# Patient Record
Sex: MALE | Race: WHITE | NOT HISPANIC OR LATINO | Employment: FULL TIME | URBAN - METROPOLITAN AREA
[De-identification: names, ages, dates, MRNs, and addresses within clinical notes are randomized per-mention and may not be internally consistent; named-entity substitution may affect disease eponyms.]

---

## 2019-02-12 NOTE — PROGRESS NOTES
"  SUBJECTIVE:   CC: Brian Darling is an 38 year old male who presents for preventive health visit.     Healthy Habits:    Do you get at least three servings of calcium containing foods daily (dairy, green leafy vegetables, etc.)? { :694978::\"yes\"}    Amount of exercise or daily activities, outside of work: { :764373}    Problems taking medications regularly { :794429::\"No\"}    Medication side effects: { :777557::\"No\"}    Have you had an eye exam in the past two years? { :653700}    Do you see a dentist twice per year? { :755480}    Do you have sleep apnea, excessive snoring or daytime drowsiness?{ :188692}  {Outside tests to abstract? :176677}    {additional problems to add (Optional):525637}    Today's PHQ-2 Score:   PHQ-2 ( 1999 Pfizer) 11/10/2015   Q1: Little interest or pleasure in doing things 0   Q2: Feeling down, depressed or hopeless 0   PHQ-2 Score 0     {PHQ-2 LOOK IN ASSESSMENTS (Optional) :632104}  Abuse: Current or Past(Physical, Sexual or Emotional)- {YES/NO/NA:581722}  Do you feel safe in your environment? {YES/NO/NA:593989}    Social History     Tobacco Use     Smoking status: Never Smoker   Substance Use Topics     Alcohol use: Yes     Comment: rarely     If you drink alcohol do you typically have >3 drinks per day or >7 drinks per week? {ETOH :296780}                      Last PSA: No results found for: PSA    Reviewed orders with patient. Reviewed health maintenance and updated orders accordingly - {Yes/No:697692::\"Yes\"}  {Chronicprobdata (Optional):149798}    Reviewed and updated as needed this visit by clinical staff         Reviewed and updated as needed this visit by Provider        {HISTORY OPTIONS (Optional):652728}    ROS:  { :048547::\"CONSTITUTIONAL: NEGATIVE for fever, chills, change in weight\",\"INTEGUMENTARY/SKIN: NEGATIVE for worrisome rashes, moles or lesions\",\"EYES: NEGATIVE for vision changes or irritation\",\"ENT: NEGATIVE for ear, mouth and throat problems\",\"RESP: NEGATIVE for " "significant cough or SOB\",\"CV: NEGATIVE for chest pain, palpitations or peripheral edema\",\"GI: NEGATIVE for nausea, abdominal pain, heartburn, or change in bowel habits\",\" male: negative for dysuria, hematuria, decreased urinary stream, erectile dysfunction, urethral discharge\",\"MUSCULOSKELETAL: NEGATIVE for significant arthralgias or myalgia\",\"NEURO: NEGATIVE for weakness, dizziness or paresthesias\",\"PSYCHIATRIC: NEGATIVE for changes in mood or affect\"}    OBJECTIVE:   There were no vitals taken for this visit.  EXAM:  {Exam Choices:046061}    {Diagnostic Test Results (Optional):169785::\"Diagnostic Test Results:\",\"none \"}    ASSESSMENT/PLAN:   {Diag Picklist:516674}    COUNSELING:  {MALE COUNSELING MESSAGES:301899::\"Reviewed preventive health counseling, as reflected in patient instructions\"}    BP Readings from Last 1 Encounters:   11/10/15 124/78     Estimated body mass index is 25.6 kg/m  as calculated from the following:    Height as of 11/10/15: 1.791 m (5' 10.5\").    Weight as of 11/10/15: 82.1 kg (181 lb).    {BP Counseling- Complete if BP >= 120/80  (Optional):901999}  {Weight Management Plan (ACO) Complete if BMI is abnormal-  Ages 18-64  BMI >24.9.  Age 65+ with BMI <23 or >30 (Optional):785898}     reports that  has never smoked. He does not have any smokeless tobacco history on file.  {Tobacco Cessation -- Complete if patient is a smoker (Optional):246226}    Counseling Resources:  ATP IV Guidelines  Pooled Cohorts Equation Calculator  FRAX Risk Assessment  ICSI Preventive Guidelines  Dietary Guidelines for Americans, 2010  USDA's MyPlate  ASA Prophylaxis  Lung CA Screening    Juanita May MD  [facility]  "

## 2019-02-19 NOTE — PROGRESS NOTES
"  Family history of prostate cancer: No  Last PSA: NA  Doing self testicular exam: NO     Family history of colon cancer:No  Last colonscopy: NA     Family history of CAD:No  Last Cholesterol:   Lab Results   Component Value Date    CHOL 179 11/03/2015     Lab Results   Component Value Date    HDL 44 11/03/2015     Lab Results   Component Value Date    LDL 96 11/03/2015     Lab Results   Component Value Date    TRIG 195 11/03/2015     Lab Results   Component Value Date    CHOLHDLRATIO 4.1 11/03/2015          Immunizations:    Date last DT Tdap 2006, due  Date last Pneumovax NA,   Date last Flu 2015      Seat Belt:{YES/NO (DEFAULT IS YES):204276::\"YES\"}   Sunscreen use: {YES/NO (DEFAULT IS YES):061863::\"YES\"}  Calcium Intake: ***   Health Care Directive:{YES/NO (DEFAULT IS YES):579563::\"YES\"}   Sexually Active:YES      Current contraception: oral contraceptives     Current Concerns: ***          Patient Ed:  Reviewed health maintenance including diet, regular exercise, and periodic exams.     The risks, benefits, and treatment options of prescribed medications or other treatments have been discussed with the patient.  The patient should call or schedule a follow up appt if no improvement or other problems.   "

## 2019-02-20 ENCOUNTER — OFFICE VISIT (OUTPATIENT)
Dept: FAMILY MEDICINE | Facility: CLINIC | Age: 39
End: 2019-02-20
Payer: COMMERCIAL

## 2019-02-20 VITALS
SYSTOLIC BLOOD PRESSURE: 121 MMHG | BODY MASS INDEX: 28.58 KG/M2 | HEIGHT: 70 IN | DIASTOLIC BLOOD PRESSURE: 77 MMHG | WEIGHT: 199.6 LBS | HEART RATE: 65 BPM | TEMPERATURE: 97.9 F | RESPIRATION RATE: 16 BRPM

## 2019-02-20 DIAGNOSIS — Z00.00 ROUTINE GENERAL MEDICAL EXAMINATION AT A HEALTH CARE FACILITY: Primary | ICD-10-CM

## 2019-02-20 DIAGNOSIS — Z23 NEED FOR TDAP VACCINATION: ICD-10-CM

## 2019-02-20 DIAGNOSIS — M79.644 THUMB PAIN, RIGHT: ICD-10-CM

## 2019-02-20 DIAGNOSIS — L30.8 OTHER ECZEMA: ICD-10-CM

## 2019-02-20 PROCEDURE — 90471 IMMUNIZATION ADMIN: CPT | Performed by: FAMILY MEDICINE

## 2019-02-20 PROCEDURE — 90715 TDAP VACCINE 7 YRS/> IM: CPT | Performed by: FAMILY MEDICINE

## 2019-02-20 PROCEDURE — 99385 PREV VISIT NEW AGE 18-39: CPT | Mod: 25 | Performed by: FAMILY MEDICINE

## 2019-02-20 PROCEDURE — 99213 OFFICE O/P EST LOW 20 MIN: CPT | Mod: 25 | Performed by: FAMILY MEDICINE

## 2019-02-20 RX ORDER — TRIAMCINOLONE ACETONIDE 1 MG/G
CREAM TOPICAL
Qty: 80 G | Refills: 1 | Status: SHIPPED | OUTPATIENT
Start: 2019-02-20

## 2019-02-20 ASSESSMENT — ENCOUNTER SYMPTOMS
NAUSEA: 0
DIZZINESS: 0
PALPITATIONS: 0
NERVOUS/ANXIOUS: 0
EYE PAIN: 0
HEADACHES: 1
SHORTNESS OF BREATH: 0
HEARTBURN: 1
COUGH: 0
MYALGIAS: 0
HEMATURIA: 0
WEAKNESS: 0
ABDOMINAL PAIN: 0
PARESTHESIAS: 0
FEVER: 0
DYSURIA: 0
SORE THROAT: 0
CHILLS: 0
FREQUENCY: 0
CONSTIPATION: 0
HEMATOCHEZIA: 0
DIARRHEA: 0
ARTHRALGIAS: 1

## 2019-02-20 ASSESSMENT — MIFFLIN-ST. JEOR: SCORE: 1831.63

## 2019-02-20 NOTE — NURSING NOTE
Screening Questionnaire for Adult Immunization    Are you sick today?   No   Do you have allergies to medications, food, a vaccine component or latex?   No   Have you ever had a serious reaction after receiving a vaccination?   No   Do you have a long-term health problem with heart disease, lung disease, asthma, kidney disease, metabolic disease (e.g. diabetes), anemia, or other blood disorder?   No   Do you have cancer, leukemia, HIV/AIDS, or any other immune system problem?   No   In the past 3 months, have you taken medications that affect  your immune system, such as prednisone, other steroids, or anticancer drugs; drugs for the treatment of rheumatoid arthritis, Crohn s disease, or psoriasis; or have you had radiation treatments?   No   Have you had a seizure, or a brain or other nervous system problem?   No   During the past year, have you received a transfusion of blood or blood     products, or been given immune (gamma) globulin or antiviral drug?   No   For women: Are you pregnant or is there a chance you could become        pregnant during the next month?   No   Have you received any vaccinations in the past 4 weeks?   No     Immunization questionnaire answers were all negative.        Per orders of Dr. May, injection of tdap given by Joyce Willis. Patient instructed to remain in clinic for 15 minutes afterwards, and to report any adverse reaction to me immediately.       Screening performed by Joyce Willis on 2/20/2019 at 2:45 PM.

## 2019-02-20 NOTE — PROGRESS NOTES
SUBJECTIVE:   CC: Brian Darling is an 38 year old male who presents for preventative health visit.     Physical   Annual:     Getting at least 3 servings of Calcium per day:  Yes    Bi-annual eye exam:  NO    Dental care twice a year:  Yes    Sleep apnea or symptoms of sleep apnea:  None    Diet:  Regular (no restrictions)    Frequency of exercise:  1 day/week    Duration of exercise:  Less than 15 minutes    Taking medications regularly:  Not Applicable    Additional concerns today:  No    PHQ-2 Total Score: 0              Today's PHQ-2 Score:   PHQ-2 ( 1999 Pfizer) 2/20/2019   Q1: Little interest or pleasure in doing things 0   Q2: Feeling down, depressed or hopeless 0   PHQ-2 Score 0   Q1: Little interest or pleasure in doing things Not at all   Q2: Feeling down, depressed or hopeless Not at all   PHQ-2 Score 0       Abuse: Current or Past(Physical, Sexual or Emotional)- No  Do you feel safe in your environment? Yes    Social History     Tobacco Use     Smoking status: Never Smoker     Smokeless tobacco: Never Used   Substance Use Topics     Alcohol use: Yes     Comment: rarely     Alcohol Use 2/20/2019   If you drink alcohol do you typically have greater than 3 drinks per day OR greater than 7 drinks per week? No   No flowsheet data found.      Reviewed orders with patient. Reviewed health maintenance and updated orders accordingly - Yes      Family history of prostate cancer: No  Last PSA: NA  Doing self testicular exam: NO     Family history of colon cancer:No  Last colonscopy: NA     Family history of CAD:No  Last Cholesterol:   Lab Results   Component Value Date    CHOL 179 11/03/2015     Lab Results   Component Value Date    HDL 44 11/03/2015     Lab Results   Component Value Date    LDL 96 11/03/2015     Lab Results   Component Value Date    TRIG 195 11/03/2015     Lab Results   Component Value Date    CHOLHDLRATIO 4.1 11/03/2015          Immunizations:    Date last DT Tdap 2006, due  Date last  Pneumovax NA,   Date last Flu 2015      Seat Belt:YES   Sunscreen use: YES  Calcium Intake: adq  Health Care Directive:NO   Sexually Active:YES      Current contraception: oral contraceptives     Current Concerns: right hand dominant, c/o right thumb pain with loaded extension and  to open something.  Had been about 6 months,  No triggering event, no previous injury, not present daily.            Patient Ed:  Reviewed health maintenance including diet, regular exercise, and periodic exams.     The risks, benefits, and treatment options of prescribed medications or other treatments have been discussed with the patient.  The patient should call or schedule a follow up appt if no improvement or other problems.     Labs reviewed in EPIC  BP Readings from Last 3 Encounters:   02/20/19 121/77   11/10/15 124/78    Wt Readings from Last 3 Encounters:   02/20/19 90.5 kg (199 lb 9.6 oz)   11/10/15 82.1 kg (181 lb)                  Patient Active Problem List   Diagnosis     CARDIOVASCULAR SCREENING; LDL GOAL LESS THAN 160     Migraine without aura and without status migrainosus, not intractable     Past Surgical History:   Procedure Laterality Date     ENT SURGERY      tonsils       Social History     Tobacco Use     Smoking status: Never Smoker     Smokeless tobacco: Never Used   Substance Use Topics     Alcohol use: Yes     Comment: rarely     Family History   Problem Relation Age of Onset     Ulcerative Colitis Mother      Diabetes Father 50     Coronary Artery Disease Maternal Grandmother      Cerebrovascular Disease Maternal Grandmother      Breast Cancer Paternal Grandmother      Diabetes Paternal Grandfather      Depression Sister      Anxiety Disorder Sister          Current Outpatient Medications   Medication Sig Dispense Refill     triamcinolone (KENALOG) 0.1 % external cream Apply sparingly to affected area three times daily as needed 80 g 1       Reviewed and updated as needed this visit by clinical  "staff  Tobacco  Allergies  Meds  Problems  Med Hx  Surg Hx  Fam Hx  Soc Hx          Reviewed and updated as needed this visit by Provider  Tobacco  Allergies  Meds  Problems  Med Hx  Surg Hx  Fam Hx            Review of Systems   Constitutional: Negative for chills and fever.   HENT: Negative for congestion, ear pain, hearing loss and sore throat.    Eyes: Negative for pain and visual disturbance.   Respiratory: Negative for cough and shortness of breath.    Cardiovascular: Negative for chest pain, palpitations and peripheral edema.   Gastrointestinal: Positive for heartburn (episodic, uses zantac when needed). Negative for abdominal pain, constipation, diarrhea, hematochezia and nausea.   Genitourinary: Negative for discharge, dysuria, frequency, genital sores, hematuria, impotence and urgency.   Musculoskeletal: Positive for arthralgias. Negative for myalgias.   Skin: Negative for rash.   Neurological: Positive for headaches (1-2 per month, resolve with excedrin, no change in pattern or frequency has adverse rxn to imitrex has not tried any other triptan). Negative for dizziness, weakness and paresthesias.   Psychiatric/Behavioral: Negative for mood changes. The patient is not nervous/anxious.          OBJECTIVE:   /77   Pulse 65   Temp 97.9  F (36.6  C) (Oral)   Resp 16   Ht 1.778 m (5' 10\")   Wt 90.5 kg (199 lb 9.6 oz)   BMI 28.64 kg/m      Physical Exam  GENERAL: healthy, alert and no distress  EYES: Eyes grossly normal to inspection, PERRL and conjunctivae and sclerae normal  HENT: ear canals and TM's normal, nose and mouth without ulcers or lesions  NECK: no adenopathy, no asymmetry, masses, or scars and thyroid normal to palpation  RESP: lungs clear to auscultation - no rales, rhonchi or wheezes  CV: regular rate and rhythm, normal S1 S2, no S3 or S4, no murmur, click or rub, no peripheral edema and peripheral pulses strong  ABDOMEN: soft, nontender, no hepatosplenomegaly, no masses " "and bowel sounds normal   (male): normal male genitalia without lesions or urethral discharge, no hernia  MS: no gross musculoskeletal defects noted, no edema, right thumb with no ttp over DIP, able to provoke symptoms  SKIN: no suspicious lesions or rashes  NEURO: Normal strength and tone, mentation intact and speech normal  PSYCH: mentation appears normal, affect normal/brightDiagnostic Test Results:  none         ASSESSMENT/PLAN:   (Z00.00) Routine general medical examination at a health care facility  (primary encounter diagnosis)  Comment: preventive needs reviewed  Plan: see orders in Epic. F/u 2 yrs for fasting labs/HIV and exam    (L30.8) Other eczema  Comment: stable  Plan: triamcinolone (KENALOG) 0.1 % external cream,         OFFICE/OUTPT VISIT,EST,LEVL II        Refill x 1 yr     (M79.644) Thumb pain, right  Comment: no injury  Plan: [facility] HAND, AND CHIROPRACTIC REFERRAL,         OFFICE/OUTPT VISIT,EST,LEVL II        Refer to hand therapy    (Z23) Need for Tdap vaccination  Comment: due  Plan: TDAP, IM (10 - 64 YRS) - [employer]acel              COUNSELING:   Reviewed preventive health counseling, as reflected in patient instructions  Special attention given to:        Regular exercise       Healthy diet/nutrition    BP Readings from Last 1 Encounters:   02/20/19 121/77     Estimated body mass index is 28.64 kg/m  as calculated from the following:    Height as of this encounter: 1.778 m (5' 10\").    Weight as of this encounter: 90.5 kg (199 lb 9.6 oz).      Weight management plan: Discussed healthy diet and exercise guidelines     reports that  has never smoked. he has never used smokeless tobacco.      Counseling Resources:  ATP IV Guidelines  Pooled Cohorts Equation Calculator  FRAX Risk Assessment  ICSI Preventive Guidelines  Dietary Guidelines for Americans, 2010  USDA's MyPlate  ASA Prophylaxis  Lung CA Screening    Juanita May MD  [facility]  "

## 2019-02-20 NOTE — NURSING NOTE
"Chief Complaint   Patient presents with     Physical       Initial /77   Pulse 65   Temp 97.9  F (36.6  C) (Oral)   Resp 16   Ht 1.778 m (5' 10\")   Wt 90.5 kg (199 lb 9.6 oz)   BMI 28.64 kg/m   Estimated body mass index is 28.64 kg/m  as calculated from the following:    Height as of this encounter: 1.778 m (5' 10\").    Weight as of this encounter: 90.5 kg (199 lb 9.6 oz).  Medication Reconciliation: complete  Maurisio Sahu CMA    "

## 2019-03-19 ENCOUNTER — THERAPY VISIT (OUTPATIENT)
Dept: OCCUPATIONAL THERAPY | Facility: CLINIC | Age: 39
End: 2019-03-19
Attending: FAMILY MEDICINE
Payer: COMMERCIAL

## 2019-03-19 DIAGNOSIS — M79.644 THUMB PAIN, RIGHT: ICD-10-CM

## 2019-03-19 PROCEDURE — 97110 THERAPEUTIC EXERCISES: CPT | Mod: GO | Performed by: OCCUPATIONAL THERAPIST

## 2019-03-19 PROCEDURE — 97165 OT EVAL LOW COMPLEX 30 MIN: CPT | Mod: GO | Performed by: OCCUPATIONAL THERAPIST

## 2019-03-19 PROCEDURE — 97760 ORTHOTIC MGMT&TRAING 1ST ENC: CPT | Mod: GO | Performed by: OCCUPATIONAL THERAPIST

## 2019-03-19 NOTE — PROGRESS NOTES
"Hand Therapy Initial Evaluation    Current Date:  3/19/2019    Subjective:  Brian (\"Adrian\") NEFTALI Darling is a 38 year old right hand dominant male.    Diagnosis:   Right thumb pain  DOI:  2/20/19 (therapy referral)    Patient reports symptoms of pain, stiffness/loss of motion and weakness/loss of strength of the right thumb which occurred due to gradual onset with unknown etiology over the past 6 months. Since onset symptoms are unchanged  Special tests:  none.  Previous treatment: none.  General health as reported by patient is good.  Pertinent medical history includes:Migraines/Headaches  Medical allergies:see EMR.  Surgical history: none.  Medication history: None.    Occupational Profile Information:  Current occupation is Psychiatric Technician   Currently working in normal job without restrictions  Job Tasks: Computer Work, Prolonged Standing, occasional restraints of patients  Prior functional level:  independent-shared household chores  Barriers include:none  Mobility: No difficulty  Transportation: drives  Leisure activities/hobbies: Golf    Functional Outcome Measure:  Upper Extremity Functional Index  SCORE:   Column Totals: 77/80  (A lower score indicates greater disability.)    Objective:  ROM:  Thumb  3/19/19   AROM(PROM) Left Right   MP 0/60 0/60   IP +30/70 +30/70   PAbd 40 40   RAbd 35 35     Strength:   (Measured in pounds)    3/19/19   Trials Left RIght   1  2  3 108  105  100 125-  126-  124-   Average: 104 125     Lat Pinch  3/19/19   Trials Left Right   1  2  3 19  21  23 16+  22+  20+   Average: 21 19     Hyperextends IP joint with 3 point pinch  3 Pt Pinch  3/19/19   Trials Left Right   1  2  3 22  24  24 18+  22-  22-   Average: 23 21     Resisted Testing:   3/19/19   EPL -   EPB -   APL -   FPL -     Special Tests:   3/19/19   Finkelsteins -   Thumb CMC grind -   Thumb CMC passive retroposition -   Thumb MCP RCL stress -   Thumb MCP UCL stress -   Thumb IP RCL stress +   Thumb IP UCL " stress -   Triggering of thumb -     Palpation:   3/19/19   RCL IP  joint + but stable   UCL IP joint -     Sensation:  WNL throughout all nerve distributions; per patient report    Pain Report:  VAS(0-10) 3/19/19   At Rest: 0/10   With Use: 6-7/10   Location:  Thumb IP joint  Pain Quality:  Sharp  Frequency: intermittent    Pain is worst:  daytime  Exacerbated by:  Gripping, weight bear, pinching  Relieved by:  otc medications and rest  Progression:  Unchanged  Assessment:  Patient presents with symptoms consistent with diagnosis of thumb IP joint pain, localized to RCL, with conservative intervention.     Patient's limitations or Problem List includes:  Weakness, Decreased stability, Hypermobility and Decreased pinch of the right thumb which interferes with the patient's ability to perform Recreational Activities and Household Chores as compared to previous level of function.    Rehab Potential:  Excellent - Return to full activity, no limitations    Patient will benefit from skilled Occupational Therapy to increase overall strength, pinch strength and stability of thumb and decrease pain to return to previous activity level and resume normal daily tasks and to reach their rehab potential.    Barriers to Learning:  No barrier    Communication Issues:  Patient appears to be able to clearly communicate and understand verbal and written communication and follow directions correctly.    Chart Review: Chart Review and Simple history review with patient    Identified Performance Deficits: home establishment and management and leisure activities    Assessment of Occupational Performance:  1-3 Performance Deficits    Clinical Decision Making (Complexity): Low complexity    Treatment Explanation:  The following has been discussed with the patient:  RX ordered/plan of care  Anticipated outcomes  Possible risks and side effects    Plan:  Frequency:  1 X a month, once daily  Duration:  for 2 months  Treatment Plan:     Modalities:  US and Paraffin  Therapeutic Exercise:  AROM, Blocking, Isotonics, Isometrics and Stabilization  Orthotic Fabrication:  Finger based orthosis  Self Care:  Self Care Tasks and Ergonomic Considerations    Discharge Plan:  Achieve all LTG.  Independent in home treatment program.  Reach maximal therapeutic benefit.    Home Exercise Program:  Ice after activity for pain  3 point and lateral pinch strengthening, avoid IP hyperextension  Avoid IP hyperextension and stress to RCL of IP joint with ADL's  Wear thumb IP gutter orthosis sleeping    Next Visit:  Patient to continue with HEP  Will return in next month if symptoms do not improve  Hold chart open for two months, if no contact is received from patient, discharge will occur at that time with this note serving as the discharge summary.

## 2019-06-10 PROBLEM — M79.644 THUMB PAIN, RIGHT: Status: RESOLVED | Noted: 2019-03-19 | Resolved: 2019-06-10

## 2022-02-22 NOTE — PATIENT INSTRUCTIONS
Preventive Health Recommendations  Male Ages 40 to 49    Yearly exam:             See your health care provider every year in order to  o   Review health changes.   o   Discuss preventive care.    o   Review your medicines if your doctor has prescribed any.    You should be tested each year for STDs (sexually transmitted diseases) if you re at risk.     Have a cholesterol test every 5 years.     Have a colonoscopy (test for colon cancer) if someone in your family has had colon cancer or polyps before age 50.     After age 45, have a diabetes test (fasting glucose). If you are at risk for diabetes, you should have this test every 3 years.      Talk with your health care provider about whether or not a prostate cancer screening test (PSA) is right for you.    Shots: Get a flu shot each year. Get a tetanus shot every 10 years.     Nutrition:    Eat at least 5 servings of fruits and vegetables daily.     Eat whole-grain bread, whole-wheat pasta and brown rice instead of white grains and rice.     Get adequate Calcium and Vitamin D.     Lifestyle    Exercise for at least 150 minutes a week (30 minutes a day, 5 days a week). This will help you control your weight and prevent disease.     Limit alcohol to one drink per day.     No smoking.     Wear sunscreen to prevent skin cancer.     See your dentist every six months for an exam and cleaning.          When ready for vasectomy Dr. Mccoy and Hosea Cronin do vasectomies in the clinic.

## 2022-02-22 NOTE — PROGRESS NOTES
SUBJECTIVE:   CC: Brian Darling is an 41 year old male who presents for preventative health visit.       Patient has been advised of split billing requirements and indicates understanding: Yes  Healthy Habits:     Getting at least 3 servings of Calcium per day:  NO    Bi-annual eye exam:  NO    Dental care twice a year:  Yes    Sleep apnea or symptoms of sleep apnea:  None    Diet:  Breakfast skipped    Frequency of exercise:  1 day/week    Duration of exercise:  Less than 15 minutes    Taking medications regularly:  Not Applicable    Medication side effects:  Not applicable    PHQ-2 Total Score: 0    Additional concerns today:  Yes                Today's PHQ-2 Score:   PHQ-2 ( 1999 [employer]) 3/2/2022   Q1: Little interest or pleasure in doing things 0   Q2: Feeling down, depressed or hopeless 0   PHQ-2 Score 0   PHQ-2 Total Score (12-17 Years)- Positive if 3 or more points; Administer PHQ-A if positive -   Q1: Little interest or pleasure in doing things Not at all   Q2: Feeling down, depressed or hopeless Not at all   PHQ-2 Score 0       Abuse: Current or Past(Physical, Sexual or Emotional)- No  Do you feel safe in your environment? Yes    Have you ever done Advance Care Planning? (For example, a Health Directive, POLST, or a discussion with a medical provider or your loved ones about your wishes): declined      Social History     Tobacco Use     Smoking status: Never Smoker     Smokeless tobacco: Never Used   Substance Use Topics     Alcohol use: Yes     Comment: rarely         Alcohol Use 3/2/2022   Prescreen: >3 drinks/day or >7 drinks/week? No   Prescreen: >3 drinks/day or >7 drinks/week? -         Reviewed orders with patient. Reviewed health maintenance and updated orders accordingly - Yes      Family history of prostate cancer: No  Last PSA: NA  Doing self testicular exam: NO     Family history of colon cancer:No  Last colonscopy: NA     Family history of CAD:No  Last Cholesterol:   Lab Results    Component Value Date    CHOL 179 11/03/2015     Lab Results   Component Value Date    HDL 44 11/03/2015     Lab Results   Component Value Date    LDL 96 11/03/2015     Lab Results   Component Value Date    TRIG 195 11/03/2015     Lab Results   Component Value Date    CHOLHDLRATIO 4.1 11/03/2015          Immunizations:    Td 2/2019  Covid:Pf boost 10/2021  Flu 12/2021  Shingrix: NA  Pneumovax NA,         Seat Belt:YES   Sunscreen use: YES  Calcium Intake: adeq  Health Care Directive:NO   Sexually Active:YES      Current contraception: condoms     Current Concerns: Right pointer finger joint pain x 2 months - pain on med/lateral aspects of pointer finger PIP joint, minimal swelling. Does not interfere with function though feels tight to fully flex and lyudmila/karan stress causes pain.  Considering vasectomy - spouse had IUD removed. Considering pregnancy, if not, will plan to have vasectomy.           Patient Ed:  Reviewed health maintenance including diet, regular exercise, and periodic exams.     The risks, benefits, and treatment options of prescribed medications or other treatments have been discussed with the patient.  The patient should call or schedule a follow up appt if no improvement or other problems.   Labs reviewed in EPIC  BP Readings from Last 3 Encounters:   03/02/22 (!) 136/94   02/20/19 121/77   11/10/15 124/78    Wt Readings from Last 3 Encounters:   03/02/22 93.5 kg (206 lb 3.2 oz)   02/20/19 90.5 kg (199 lb 9.6 oz)   11/10/15 82.1 kg (181 lb)                  Patient Active Problem List   Diagnosis     CARDIOVASCULAR SCREENING; LDL GOAL LESS THAN 160     Migraine without aura and without status migrainosus, not intractable     Hand dermatitis     History of COVID-19     Past Surgical History:   Procedure Laterality Date     ENT SURGERY      tonsils       Social History     Tobacco Use     Smoking status: Never Smoker     Smokeless tobacco: Never Used   Substance Use Topics     Alcohol use: Yes      "Comment: rarely     Family History   Problem Relation Age of Onset     Ulcerative Colitis Mother      Diabetes Father 50     Ulcerative Colitis Father      Depression Sister      Anxiety Disorder Sister      Coronary Artery Disease Maternal Grandmother      Cerebrovascular Disease Maternal Grandmother      Breast Cancer Paternal Grandmother      Diabetes Paternal Grandfather          Current Outpatient Medications   Medication Sig Dispense Refill     triamcinolone (KENALOG) 0.1 % external cream Apply sparingly to affected area three times daily as needed 80 g 1       Reviewed and updated as needed this visit by clinical staff   Tobacco  Allergies  Meds  Problems  Med Hx  Surg Hx  Fam Hx  Soc   Hx        Reviewed and updated as needed this visit by Provider   Tobacco  Allergies  Meds  Problems  Med Hx  Surg Hx  Fam Hx             Review of Systems   Constitutional: Negative for chills and fever.   HENT: Negative for congestion, ear pain, hearing loss and sore throat.    Eyes: Negative for pain and visual disturbance.   Respiratory: Negative for cough and shortness of breath.    Cardiovascular: Negative for chest pain, palpitations and peripheral edema.   Gastrointestinal: Negative for abdominal pain, constipation, diarrhea, heartburn, hematochezia and nausea.   Genitourinary: Negative for dysuria, frequency, genital sores, hematuria and urgency.   Musculoskeletal: Positive for arthralgias. Negative for joint swelling and myalgias.   Skin: Negative for rash.   Neurological: Positive for headaches. Negative for dizziness, weakness and paresthesias.   Psychiatric/Behavioral: Negative for mood changes. The patient is not nervous/anxious.          OBJECTIVE:   BP (!) 136/94   Pulse 59   Temp (!) 96.2  F (35.7  C) (Oral)   Resp 16   Ht 1.803 m (5' 11\")   Wt 93.5 kg (206 lb 3.2 oz)   SpO2 97%   BMI 28.76 kg/m      Physical Exam  GENERAL: healthy, alert and no distress  EYES: Eyes grossly normal to " "inspection, PERRL and conjunctivae and sclerae normal  HENT: ear canals and TM's normal, nose and mouth without ulcers or lesions  NECK: no adenopathy, no asymmetry, masses, or scars and thyroid normal to palpation  RESP: lungs clear to auscultation - no rales, rhonchi or wheezes  CV: regular rate and rhythm, normal S1 S2, no S3 or S4, no murmur, click or rub, no peripheral edema and peripheral pulses strong  ABDOMEN: soft, nontender, no hepatosplenomegaly, no masses and bowel sounds normal  MS: no gross musculoskeletal defects noted, no edema  SKIN: no suspicious lesions or rashes  NEURO: Normal strength and tone, mentation intact and speech normal  PSYCH: mentation appears normal, affect normal/bright    Diagnostic Test Results:  Labs reviewed in Epic    ASSESSMENT/PLAN:   (Z00.00) Routine general medical examination at a health care facility  (primary encounter diagnosis)  Comment: preventive needs reviewed   Plan: see orders in Epic.   Considering vasectomy    (Z13.1) Screening for diabetes mellitus  Comment: due  Plan: Glucose            (Z13.6) CARDIOVASCULAR SCREENING; LDL GOAL LESS THAN 160  Comment: due  Plan: Lipid panel reflex to direct LDL Fasting            (Z86.16) History of COVID-19  Comment: full recovery though taste/smell still off slightly/different  Plan: positive test 11/2021        COUNSELING:   Reviewed preventive health counseling, as reflected in patient instructions  Special attention given to:        Regular exercise       Healthy diet/nutrition    Estimated body mass index is 28.76 kg/m  as calculated from the following:    Height as of this encounter: 1.803 m (5' 11\").    Weight as of this encounter: 93.5 kg (206 lb 3.2 oz).         He reports that he has never smoked. He has never used smokeless tobacco.      Counseling Resources:  ATP IV Guidelines  Pooled Cohorts Equation Calculator  FRAX Risk Assessment  ICSI Preventive Guidelines  Dietary Guidelines for Americans, 2010  USDA's " MyPlate  ASA Prophylaxis  Lung CA Screening    Juanita May MD  [facility]

## 2022-03-02 ENCOUNTER — OFFICE VISIT (OUTPATIENT)
Dept: FAMILY MEDICINE | Facility: CLINIC | Age: 42
End: 2022-03-02
Payer: COMMERCIAL

## 2022-03-02 VITALS
DIASTOLIC BLOOD PRESSURE: 94 MMHG | TEMPERATURE: 96.2 F | SYSTOLIC BLOOD PRESSURE: 136 MMHG | OXYGEN SATURATION: 97 % | HEIGHT: 71 IN | HEART RATE: 59 BPM | RESPIRATION RATE: 16 BRPM | WEIGHT: 206.2 LBS | BODY MASS INDEX: 28.87 KG/M2

## 2022-03-02 DIAGNOSIS — Z13.1 SCREENING FOR DIABETES MELLITUS: ICD-10-CM

## 2022-03-02 DIAGNOSIS — Z00.00 ROUTINE GENERAL MEDICAL EXAMINATION AT A HEALTH CARE FACILITY: Primary | ICD-10-CM

## 2022-03-02 DIAGNOSIS — Z86.16 HISTORY OF COVID-19: ICD-10-CM

## 2022-03-02 DIAGNOSIS — Z13.6 CARDIOVASCULAR SCREENING; LDL GOAL LESS THAN 160: ICD-10-CM

## 2022-03-02 LAB
CHOLEST SERPL-MCNC: 234 MG/DL
FASTING STATUS PATIENT QL REPORTED: YES
FASTING STATUS PATIENT QL REPORTED: YES
GLUCOSE BLD-MCNC: 105 MG/DL (ref 70–99)
HDLC SERPL-MCNC: 40 MG/DL
LDLC SERPL CALC-MCNC: 165 MG/DL
NONHDLC SERPL-MCNC: 194 MG/DL
TRIGL SERPL-MCNC: 145 MG/DL

## 2022-03-02 PROCEDURE — 36415 COLL VENOUS BLD VENIPUNCTURE: CPT | Performed by: FAMILY MEDICINE

## 2022-03-02 PROCEDURE — 80061 LIPID PANEL: CPT | Performed by: FAMILY MEDICINE

## 2022-03-02 PROCEDURE — 99396 PREV VISIT EST AGE 40-64: CPT | Performed by: FAMILY MEDICINE

## 2022-03-02 PROCEDURE — 82947 ASSAY GLUCOSE BLOOD QUANT: CPT | Performed by: FAMILY MEDICINE

## 2022-03-02 ASSESSMENT — ENCOUNTER SYMPTOMS
PARESTHESIAS: 0
DIARRHEA: 0
NERVOUS/ANXIOUS: 0
HEMATURIA: 0
COUGH: 0
ARTHRALGIAS: 1
ABDOMINAL PAIN: 0
WEAKNESS: 0
MYALGIAS: 0
FREQUENCY: 0
EYE PAIN: 0
FEVER: 0
DIZZINESS: 0
PALPITATIONS: 0
JOINT SWELLING: 0
NAUSEA: 0
CONSTIPATION: 0
HEARTBURN: 0
DYSURIA: 0
HEMATOCHEZIA: 0
HEADACHES: 1
SORE THROAT: 0
SHORTNESS OF BREATH: 0
CHILLS: 0

## 2022-03-02 ASSESSMENT — PAIN SCALES - GENERAL: PAINLEVEL: NO PAIN (0)

## 2022-03-02 NOTE — NURSING NOTE
"Chief Complaint   Patient presents with     Physical       Initial BP (!) 146/94   Pulse 59   Temp (!) 96.2  F (35.7  C) (Oral)   Resp 16   Ht 1.803 m (5' 11\")   Wt 93.5 kg (206 lb 3.2 oz)   SpO2 97%   BMI 28.76 kg/m   Estimated body mass index is 28.76 kg/m  as calculated from the following:    Height as of this encounter: 1.803 m (5' 11\").    Weight as of this encounter: 93.5 kg (206 lb 3.2 oz).  Medication Reconciliation: complete  Maurisio Sahu CMA    "

## 2022-03-02 NOTE — LETTER
March 3, 2022      Brian Darling  [address]  [place] [address]        Brian,   Your labs are ok.  Your sugar is high due to prolonged fasting, your body gets hungry so makes it's own sugar.   Your cholesterol is higher than 6 years ago and your fats are lower.  We no longer make medication decisions based on just numbers.  We use a calculation to assess your risk of stroke or heart attack in the next 10 years.  Any risk of 7.5% or higher would indicate a need for medication.  Your risk is only 2.6%.     The changes you are making will pay off. We can plan to check with your next physical.     Juanita May MD       Resulted Orders   Lipid panel reflex to direct LDL Fasting   Result Value Ref Range    Cholesterol 234 (H) <200 mg/dL    Triglycerides 145 <150 mg/dL    Direct Measure HDL 40 >=40 mg/dL    LDL Cholesterol Calculated 165 (H) <=100 mg/dL    Non HDL Cholesterol 194 (H) <130 mg/dL    Patient Fasting > 8hrs? Yes     Narrative    Cholesterol  Desirable:  <200 mg/dL    Triglycerides  Normal:  Less than 150 mg/dL  Borderline High:  150-199 mg/dL  High:  200-499 mg/dL  Very High:  Greater than or equal to 500 mg/dL    Direct Measure HDL  Female:  Greater than or equal to 50 mg/dL   Male:  Greater than or equal to 40 mg/dL    LDL Cholesterol  Desirable:  <100mg/dL  Above Desirable:  100-129 mg/dL   Borderline High:  130-159 mg/dL   High:  160-189 mg/dL   Very High:  >= 190 mg/dL    Non HDL Cholesterol  Desirable:  130 mg/dL  Above Desirable:  130-159 mg/dL  Borderline High:  160-189 mg/dL  High:  190-219 mg/dL  Very High:  Greater than or equal to 220 mg/dL   Glucose   Result Value Ref Range    Glucose 105 (H) 70 - 99 mg/dL    Patient Fasting > 8hrs? Yes

## 2022-05-05 NOTE — PROGRESS NOTES
"  SUBJECTIVE:  The patient presents for discussion of vasectomy.  The procedure was explained in detail using diagram from the [facility] pamphlet [facility].  The permanency and effactiveness of this operation were explained in detail, including casectomy failure rate, bleeding, infection, scarring, chronic pain and epididymititis.  The patient was told to stay at bedrest for 48-72 hours, no heavy lifting for one week and to refrain from sex for 1 week.  The patient was also told to have a post operative sperm count at 3 months.  He should have another birth control until he has a sample that is infertile.      We next discussed the risks of vasectomy. The risks discussed included:    -Bleeding at the time of the procedure or later including hematoma development.  -Infection  -Postoperative discomfort  -Development of a sperm granuloma which is a lump that can form at the site of the transection of the vas deferens.  -Sperm buildup in the testicles that may cause a sensation of congestion or discomfort. which is usually responsive to a non steroidal anti-inflammatory drug.  -Epididymitis can also occur and can cause scrotal discomfort.  -Reconnection of the vas deferans which can occur in up to 1 in 2000 cases per the literature.  -Development of sperm antibodies which may leave a man infertile despite having a reversal of the vasectomy later.  - Long term testicular discomfort which is very rare.        OBJECTIVE:  On exam, this is a well-developed, well-nourished white male.  Weight is 201 lbs 0 oz.  Height is   Ht Readings from Last 2 Encounters:   05/06/22 1.803 m (5' 11\")   03/02/22 1.803 m (5' 11\")     Exam reveals a normal circumcised penis, no lesions.  Testes are descended bilaterally. Both vas deferens were easily identified by palpation.  No abnormalities were noted. Exam was limited to the genitalia    ASSESSMENT:  Vasectomy evaluation    PLAN:  He will schedule a vasectomy at his " convenience. He was asked to wear warm layers of clothes on top and to wear warm socks.  He is aware that he will need to take several days off from work and any physical activity and essentially rest for two days with ice packs and ibuprofen for discomfort. He was also informed that he will need to bring a semen sample in 3 months to make sure that he has cleared the urinary tract  of any residual sperm and to make sure that he is sterile. He was informed that he should continue to use contraception until we have proven that he is sterile.    Hosea Cronin PA-C

## 2022-05-06 ENCOUNTER — OFFICE VISIT (OUTPATIENT)
Dept: FAMILY MEDICINE | Facility: CLINIC | Age: 42
End: 2022-05-06
Payer: COMMERCIAL

## 2022-05-06 VITALS
HEIGHT: 71 IN | SYSTOLIC BLOOD PRESSURE: 120 MMHG | WEIGHT: 201 LBS | OXYGEN SATURATION: 97 % | HEART RATE: 58 BPM | DIASTOLIC BLOOD PRESSURE: 84 MMHG | RESPIRATION RATE: 14 BRPM | TEMPERATURE: 97 F | BODY MASS INDEX: 28.14 KG/M2

## 2022-05-06 DIAGNOSIS — Z30.09 VASECTOMY EVALUATION: Primary | ICD-10-CM

## 2022-05-06 PROCEDURE — 99213 OFFICE O/P EST LOW 20 MIN: CPT | Performed by: PHYSICIAN ASSISTANT

## 2022-05-06 ASSESSMENT — PAIN SCALES - GENERAL: PAINLEVEL: NO PAIN (0)

## 2022-05-06 NOTE — PATIENT INSTRUCTIONS
Information for your Vasectomy.    Please eat something before your procedure.     Please arrive about 15 minutes before your scheduled time.     It is ok to take some tylenol 1-2 hours prior to your procedure.     Please wear warm clothing.    Procedure takes about 1 hr but plan on being at the clinic about 1.5 hours.    Feel free to wear compression shorts/ or arthletic supporter for comfort.     Plan on very light activity for 1 wk with no lifting more that 20 lbs.     I recommend taking 2 days to recline with Icing 20 mins every couple yours.     Ok to use: Ibuprofen 600-800 mg three times daily or Aleve twice daily and/ or Tylenol 1-2 tabs po q4h as needed.    Abstain  from intercourse for about 1 wk so you can heal.    You can shower normally the next day. Just be gentle around that area.     You are not consider sterile until you leave a  sample that doesn't contain sperm. (usually done about 3 months after vasectomy)    You will need to ejaculate at least 20 times between your surgery and the first sample.     Your may go home after procedure is complete.  There may be some pain in your groin for 3 to 4 days after the procedure.  You may have some blood or yellow liquid that loses from the incision sites.  He may have some swelling and bruising.  This is all normal.    Sutures will dissolve on their own and that can take a week or 2.    Watch for signs of infection which may include fever, pussy discharge, increased pain, swelling or redness.  Please contact us if this occurs.    Feel free to reach out to me with any questions that you have.     It is important that you rest for the first 48 hours and keep your activities minimal.        Thanks,  Hosea Cronin PA-C

## 2023-01-08 ENCOUNTER — HEALTH MAINTENANCE LETTER (OUTPATIENT)
Age: 43
End: 2023-01-08

## 2024-02-25 SDOH — HEALTH STABILITY: PHYSICAL HEALTH: ON AVERAGE, HOW MANY DAYS PER WEEK DO YOU ENGAGE IN MODERATE TO STRENUOUS EXERCISE (LIKE A BRISK WALK)?: 1 DAY

## 2024-02-25 SDOH — HEALTH STABILITY: PHYSICAL HEALTH: ON AVERAGE, HOW MANY MINUTES DO YOU ENGAGE IN EXERCISE AT THIS LEVEL?: 30 MIN

## 2024-02-25 ASSESSMENT — SOCIAL DETERMINANTS OF HEALTH (SDOH): HOW OFTEN DO YOU GET TOGETHER WITH FRIENDS OR RELATIVES?: ONCE A WEEK

## 2024-02-26 ENCOUNTER — OFFICE VISIT (OUTPATIENT)
Dept: FAMILY MEDICINE | Facility: CLINIC | Age: 44
End: 2024-02-26
Payer: COMMERCIAL

## 2024-02-26 VITALS
RESPIRATION RATE: 18 BRPM | HEART RATE: 57 BPM | DIASTOLIC BLOOD PRESSURE: 89 MMHG | OXYGEN SATURATION: 96 % | SYSTOLIC BLOOD PRESSURE: 135 MMHG | BODY MASS INDEX: 29.01 KG/M2 | HEIGHT: 71 IN | WEIGHT: 207.2 LBS | TEMPERATURE: 97.9 F

## 2024-02-26 DIAGNOSIS — R53.83 OTHER FATIGUE: ICD-10-CM

## 2024-02-26 DIAGNOSIS — R68.82 LOW LIBIDO: ICD-10-CM

## 2024-02-26 DIAGNOSIS — Z13.6 CARDIOVASCULAR SCREENING; LDL GOAL LESS THAN 160: ICD-10-CM

## 2024-02-26 DIAGNOSIS — R10.13 EPIGASTRIC PAIN: ICD-10-CM

## 2024-02-26 DIAGNOSIS — F41.9 ANXIETY: ICD-10-CM

## 2024-02-26 DIAGNOSIS — Z00.00 ROUTINE GENERAL MEDICAL EXAMINATION AT A HEALTH CARE FACILITY: Primary | ICD-10-CM

## 2024-02-26 LAB
ALBUMIN SERPL BCG-MCNC: 4.8 G/DL (ref 3.5–5.2)
ALP SERPL-CCNC: 87 U/L (ref 40–150)
ALT SERPL W P-5'-P-CCNC: 36 U/L (ref 0–70)
ANION GAP SERPL CALCULATED.3IONS-SCNC: 12 MMOL/L (ref 7–15)
AST SERPL W P-5'-P-CCNC: 30 U/L (ref 0–45)
BILIRUB SERPL-MCNC: 0.8 MG/DL
BUN SERPL-MCNC: 19.6 MG/DL (ref 6–20)
CALCIUM SERPL-MCNC: 9.9 MG/DL (ref 8.6–10)
CHLORIDE SERPL-SCNC: 104 MMOL/L (ref 98–107)
CHOLEST SERPL-MCNC: 230 MG/DL
CREAT SERPL-MCNC: 1.08 MG/DL (ref 0.67–1.17)
DEPRECATED HCO3 PLAS-SCNC: 26 MMOL/L (ref 22–29)
EGFRCR SERPLBLD CKD-EPI 2021: 87 ML/MIN/1.73M2
FASTING STATUS PATIENT QL REPORTED: YES
FERRITIN SERPL-MCNC: 134 NG/ML (ref 31–409)
GLUCOSE SERPL-MCNC: 98 MG/DL (ref 70–99)
HDLC SERPL-MCNC: 39 MG/DL
LDLC SERPL CALC-MCNC: 159 MG/DL
NONHDLC SERPL-MCNC: 191 MG/DL
POTASSIUM SERPL-SCNC: 4.2 MMOL/L (ref 3.4–5.3)
PROT SERPL-MCNC: 7.7 G/DL (ref 6.4–8.3)
SHBG SERPL-SCNC: 29 NMOL/L (ref 11–80)
SODIUM SERPL-SCNC: 142 MMOL/L (ref 135–145)
TRIGL SERPL-MCNC: 158 MG/DL
TSH SERPL DL<=0.005 MIU/L-ACNC: 2.92 UIU/ML (ref 0.3–4.2)

## 2024-02-26 PROCEDURE — 84443 ASSAY THYROID STIM HORMONE: CPT | Performed by: FAMILY MEDICINE

## 2024-02-26 PROCEDURE — 84403 ASSAY OF TOTAL TESTOSTERONE: CPT | Performed by: FAMILY MEDICINE

## 2024-02-26 PROCEDURE — 82728 ASSAY OF FERRITIN: CPT | Performed by: FAMILY MEDICINE

## 2024-02-26 PROCEDURE — 80053 COMPREHEN METABOLIC PANEL: CPT | Performed by: FAMILY MEDICINE

## 2024-02-26 PROCEDURE — 84270 ASSAY OF SEX HORMONE GLOBUL: CPT | Performed by: FAMILY MEDICINE

## 2024-02-26 PROCEDURE — 99214 OFFICE O/P EST MOD 30 MIN: CPT | Mod: 25 | Performed by: FAMILY MEDICINE

## 2024-02-26 PROCEDURE — 99396 PREV VISIT EST AGE 40-64: CPT | Performed by: FAMILY MEDICINE

## 2024-02-26 PROCEDURE — 80061 LIPID PANEL: CPT | Performed by: FAMILY MEDICINE

## 2024-02-26 PROCEDURE — 36415 COLL VENOUS BLD VENIPUNCTURE: CPT | Performed by: FAMILY MEDICINE

## 2024-02-26 RX ORDER — OMEPRAZOLE 20 MG/1
20 TABLET, DELAYED RELEASE ORAL DAILY
COMMUNITY

## 2024-02-26 ASSESSMENT — PAIN SCALES - GENERAL: PAINLEVEL: NO PAIN (0)

## 2024-02-26 NOTE — PATIENT INSTRUCTIONS
Preventive Care Advice   This is general advice given by our system to help you stay healthy. However, your care team may have specific advice just for you. Please talk to your care team about your preventive care needs.  Nutrition  Eat 5 or more servings of fruits and vegetables each day.  Try wheat bread, brown rice and whole grain pasta (instead of white bread, rice, and pasta).  Get enough calcium and vitamin D. Check the label on foods and aim for 100% of the RDA (recommended daily allowance).  Lifestyle  Exercise at least 150 minutes each week  (30 minutes a day, 5 days a week).  Do muscle strengthening activities 2 days a week. These help control your weight and prevent disease.  No smoking.  Wear sunscreen to prevent skin cancer.  Have a dental exam and cleaning every 6 months.  Yearly exams  See your health care team every year to talk about:  Any changes in your health.  Any medicines your care team has prescribed.  Preventive care, family planning, and ways to prevent chronic diseases.  Shots (vaccines)   HPV shots (up to age 26), if you've never had them before.  Hepatitis B shots (up to age 59), if you've never had them before.  COVID-19 shot: Get this shot when it's due.  Flu shot: Get a flu shot every year.  Tetanus shot: Get a tetanus shot every 10 years.  Pneumococcal, hepatitis A, and RSV shots: Ask your care team if you need these based on your risk.  Shingles shot (for age 50 and up)  General health tests  Diabetes screening:  Starting at age 35, Get screened for diabetes at least every 3 years.  If you are younger than age 35, ask your care team if you should be screened for diabetes.  Cholesterol test: At age 39, start having a cholesterol test every 5 years, or more often if advised.  Bone density scan (DEXA): At age 50, ask your care team if you should have this scan for osteoporosis (brittle bones).  Hepatitis C: Get tested at least once in your life.  STIs (sexually transmitted  "infections)  Before age 24: Ask your care team if you should be screened for STIs.  After age 24: Get screened for STIs if you're at risk. You are at risk for STIs (including HIV) if:  You are sexually active with more than one person.  You don't use condoms every time.  You or a partner was diagnosed with a sexually transmitted infection.  If you are at risk for HIV, ask about PrEP medicine to prevent HIV.  Get tested for HIV at least once in your life, whether you are at risk for HIV or not.  Cancer screening tests  Cervical cancer screening: If you have a cervix, begin getting regular cervical cancer screening tests starting at age 21.  Breast cancer scan (mammogram): If you've ever had breasts, begin having regular mammograms starting at age 40. This is a scan to check for breast cancer.  Colon cancer screening: It is important to start screening for colon cancer at age 45.  Have a colonoscopy test every 10 years (or more often if you're at risk) Or, ask your provider about stool tests like a FIT test every year or Cologuard test every 3 years.  To learn more about your testing options, visit:   https://www.[employer]/625072.pdf.  For help making a decision, visit:   https://bit.ly/kc22689.  Prostate cancer screening test: If you have a prostate, ask your care team if a prostate cancer screening test (PSA) at age 55 is right for you.  Lung cancer screening: If you are a current or former smoker ages 50 to 80, ask your care team if ongoing lung cancer screenings are right for you.  For informational purposes only. Not to replace the advice of your health care provider. Copyright   2023 [employer]. All rights reserved. Clinically reviewed by the [facility]itions [facility]. [employer] 901282 - REV 01/24.  Where can you learn more?  Go to https://www.healthPrenova.net/patiented  Enter N032 in the search box to learn more about \"Learning About Stress.\"  Current as of: February 26, 2023          "      Content Version: 13.8    6829-3328 [employer].   Care instructions adapted under license by your healthcare professional. If you have questions about a medical condition or this instruction, always ask your healthcare professional. [employer] disclaims any warranty or liability for your use of this information.    Referral for Behavioral Health Clinician (BHC)    During our visit, I encouraged you talk with our Behavioral Health Clinician (BHC). A BHC would be a great addition to your care team and will support your whole health!    What is a Behavioral Health Clinician (BHC)?    A BHC is a licensed mental health therapist. They can help you when behaviors, emotions, stress or worries get in the way of your life or health. Your BHC will work with you and your primary care team. Together, you'll come up with a unique care plan that works for you!         [facility]: BRENNON Lizama, ARNAV    What will happen when I meet with a BHC?    BHCs ask questions to better understand your concerns. They will provide brief, solution-focused therapy. They can also make referrals to a specialty therapist or other services to help you reach your goals.      How do I schedule an appointment with the [facility]?    Call [phone] and ask for a [facility] appointment. You can schedule a virtual or in-person session.    How much time will I spend with the [facility]?     First visits are 45-60 minutes.  Return visits are typically 20-30 minutes.         How does billing work?      Outpatient mental health services are billed to insurance. Most plans don't charge a second co-pay if you see your primary care provider (PCP) the same day. Please verify your coverage and ask about your copay.         After the Appointment    As with any provider appointment, you will be given a survey in the mail to let us know how we are doing. This allows us to not only improve services where needed, but also to reinforce  where services are going well.

## 2024-02-26 NOTE — PROGRESS NOTES
"Preventive Care Visit  [facility]  Juanita May MD, Family Medicine  Feb 26, 2024    Assessment & Plan     (Z00.00) Routine general medical examination at a health care facility  (primary encounter diagnosis)  Comment: preventive needs reviewed   Plan: see orders in Epic.     (R10.13) Epigastric pain  Comment: worsening and mainly after fatty foods  Plan: US Abdomen Complete, Comprehensive metabolic         panel (BMP + Alb, Alk Phos, ALT, AST, Total.         Bili, TP), OFFICE/OUTPT VISIT,EST,LEVL IV        Check cmp and ultrasound    (R53.83) Other fatigue  (R68.82) Low libido  Comment: uncertain etiology  Plan: TSH with free T4 reflex, Ferritin, Testosterone        Free and Total, OFFICE/OUTPT VISIT,EST,LEVL IV        Assess labs, treat as indicated    (F41.9) Anxiety  Comment: mainly situational, not yet interested in medication  Plan: not causing poor functioning  Recommend [facility], consider DBT with spouse to reduce stress and improve communication  Agree with delaying medication as side effects may worsen fatigue    (Z13.6) CARDIOVASCULAR SCREENING; LDL GOAL LESS THAN 160  Comment: due  Plan: Lipid panel reflex to direct LDL Fasting                      BMI  Estimated body mass index is 28.9 kg/m  as calculated from the following:    Height as of this encounter: 1.803 m (5' 11\").    Weight as of this encounter: 94 kg (207 lb 3.2 oz).   Weight management plan: Discussed healthy diet and exercise guidelines    Counseling  Appropriate preventive services were discussed with this patient, including applicable screening as appropriate for fall prevention, nutrition, physical activity, Tobacco-use cessation, weight loss and cognition.  Checklist reviewing preventive services available has been given to the patient.  Reviewed patient's diet, addressing concerns and/or questions.   He is at risk for lack of exercise and has been provided with information to increase physical activity for the " benefit of his well-being.   He is at risk for psychosocial distress and has been provided with information to reduce risk.       See Patient Instructions    Subjective   Adrian is a 43 year old, presenting for the following:  Physical        2/26/2024     7:53 AM   Additional Questions   Roomed by Maurisio        Health Care Directive  Patient does not have a Health Care Directive or Living Will: Discussed advance care planning with patient; information given to patient to review.    HPI  Taking daily omeprazole - having breakthrough symptoms - going on several years.  More with fatty foods, no stool changes, no n/v.    More fatigue despite good sleep, low energy, lower libido    Spouse concern about his anxiety, does have anxiety though mainly when in conflict with his wife. Relationship is good, stressed when arguing.     Feels done with children, met with Hosea Cronin in 2022 for vasectomy counseling and did not schedule the appt. Now using pull out which adds to anxiety and may impact desire due to worried about pregnancy.  No issue with getting/maintaining erection and is able to ejaculate.                2/25/2024   General Health   How would you rate your overall physical health? (!) FAIR   Feel stress (tense, anxious, or unable to sleep) Only a little   (!) STRESS CONCERN      2/25/2024   Nutrition   Three or more servings of calcium each day? (!) I DON'T KNOW   Diet: Regular (no restrictions)   How many servings of fruit and vegetables per day? (!) 0-1   How many sweetened beverages each day? 0-1         2/25/2024   Exercise   Days per week of moderate/strenous exercise 1 day   Average minutes spent exercising at this level 30 min   (!) EXERCISE CONCERN      2/25/2024   Social Factors   Frequency of gathering with friends or relatives Once a week   Worry food won't last until get money to buy more No   Food not last or not have enough money for food? No   Do you have housing?  Yes   Are you worried about losing  your housing? No   Lack of transportation? No   Unable to get utilities (heat,electricity)? No         2/25/2024   Dental   Dentist two times every year? Yes         2/25/2024   TB Screening   Were you born outside of [place]?  No         Today's PHQ-2 Score:       2/25/2024    11:57 AM   PHQ-2 ( 1999 [employer])   Q1: Little interest or pleasure in doing things 0   Q2: Feeling down, depressed or hopeless 0   PHQ-2 Score 0   Q1: Little interest or pleasure in doing things Not at all   Q2: Feeling down, depressed or hopeless Not at all   PHQ-2 Score 0           2/25/2024   Substance Use   Alcohol more than 3/day or more than 7/wk No   Do you use any other substances recreationally? No     Social History     Tobacco Use    Smoking status: Never    Smokeless tobacco: Never   Vaping Use    Vaping Use: Never used   Substance Use Topics    Alcohol use: Yes     Comment: Minimal    Drug use: No       Family history of prostate cancer: No  Last PSA: N  Doing self testicular exam: NO     Family history of colon cancer:No  Last colonscopy: NA     Family history of CAD:No  Last Cholesterol:   Lab Results   Component Value Date    CHOL 234 03/02/2022    CHOL 179 11/03/2015     Lab Results   Component Value Date    HDL 40 03/02/2022    HDL 44 11/03/2015     Lab Results   Component Value Date     03/02/2022    LDL 96 11/03/2015     Lab Results   Component Value Date    TRIG 145 03/02/2022    TRIG 195 11/03/2015     Lab Results   Component Value Date    CHOLHDLRATIO 4.1 11/03/2015          Immunizations:    Td tdap 2/2019  Covid:10/2021  Flu 10/2023  Shingrix: NA  Pneumovax NA,     Seat Belt:YES   Sunscreen use: YES  Calcium Intake: adeq  Health Care Directive:NO   Sexually Active:YES      Current contraception: pull out     Current Concerns: see above       Patient Ed:  Reviewed health maintenance including diet, regular exercise, and periodic exams.     The risks, benefits, and treatment options of prescribed medications or other  treatments have been discussed with the patient.  The patient should call or schedule a follow up appt if no improvement or other problems.         2/25/2024   STI Screening   New sexual partner(s) since last STI/HIV test? No   ASCVD Risk   The 10-year ASCVD risk score (Heidi GREEN, et al., 2019) is: 3.1%    Values used to calculate the score:      Age: 43 years      Sex: Male      Is Non- : No      Diabetic: No      Tobacco smoker: No      Systolic Blood Pressure: 135 mmHg      Is BP treated: No      HDL Cholesterol: 40 mg/dL      Total Cholesterol: 234 mg/dL        2/25/2024   Contraception/Family Planning   Questions about contraception or family planning (!) YES         Reviewed and updated as needed this visit by Provider   Tobacco  Allergies  Meds  Problems  Med Hx  Surg Hx  Fam Hx            Labs reviewed in EPIC  BP Readings from Last 3 Encounters:   02/26/24 135/89   05/06/22 120/84   03/02/22 (!) 136/94    Wt Readings from Last 3 Encounters:   02/26/24 94 kg (207 lb 3.2 oz)   05/06/22 91.2 kg (201 lb)   03/02/22 93.5 kg (206 lb 3.2 oz)                  Patient Active Problem List   Diagnosis    CARDIOVASCULAR SCREENING; LDL GOAL LESS THAN 160    Migraine without aura and without status migrainosus, not intractable    Hand dermatitis    History of COVID-19     Past Surgical History:   Procedure Laterality Date    ENT SURGERY      tonsils       Social History     Tobacco Use    Smoking status: Never    Smokeless tobacco: Never   Substance Use Topics    Alcohol use: Yes     Comment: Minimal     Family History   Problem Relation Age of Onset    Ulcerative Colitis Mother     Diabetes Father 50    Ulcerative Colitis Father     Depression Sister     Anxiety Disorder Sister     Coronary Artery Disease Maternal Grandmother     Cerebrovascular Disease Maternal Grandmother     Breast Cancer Paternal Grandmother     Diabetes Paternal Grandfather          Current Outpatient  "Medications   Medication Sig Dispense Refill    omeprazole (PRILOSEC OTC) 20 MG EC tablet Take 20 mg by mouth daily      triamcinolone (KENALOG) 0.1 % external cream Apply sparingly to affected area three times daily as needed 80 g 1         Review of Systems  Constitutional, neuro, ENT, endocrine, pulmonary, cardiac, gastrointestinal, genitourinary, musculoskeletal, integument and psychiatric systems are negative, except as otherwise noted.     Objective    Exam  /89   Pulse 57   Temp 97.9  F (36.6  C) (Oral)   Resp 18   Ht 1.803 m (5' 11\")   Wt 94 kg (207 lb 3.2 oz)   SpO2 96%   BMI 28.90 kg/m     Estimated body mass index is 28.9 kg/m  as calculated from the following:    Height as of this encounter: 1.803 m (5' 11\").    Weight as of this encounter: 94 kg (207 lb 3.2 oz).    Physical Exam  GENERAL: alert and no distress  EYES: Eyes grossly normal to inspection, PERRL and conjunctivae and sclerae normal  HENT: ear canals and TM's normal, nose and mouth without ulcers or lesions  NECK: no adenopathy, no asymmetry, masses, or scars  RESP: lungs clear to auscultation - no rales, rhonchi or wheezes  CV: regular rate and rhythm, normal S1 S2, no S3 or S4, no murmur, click or rub, no peripheral edema  ABDOMEN: soft, nontender, no hepatosplenomegaly, no masses and bowel sounds normal   (male): normal male genitalia without lesions or urethral discharge, no hernia  MS: no gross musculoskeletal defects noted, no edema  SKIN: no suspicious lesions or rashes  NEURO: Normal strength and tone, mentation intact and speech normal  PSYCH: mentation appears normal, affect normal/bright      Signed Electronically by: Juanita May MD    "

## 2024-02-27 ENCOUNTER — TELEPHONE (OUTPATIENT)
Dept: FAMILY MEDICINE | Facility: CLINIC | Age: 44
End: 2024-02-27
Payer: COMMERCIAL

## 2024-02-27 NOTE — TELEPHONE ENCOUNTER
Informed patient of need for updated consult for vasectomy.  Patient's last consult was May 2022.  Patient states he will have to call back clinic when he knows his schedule.  Yuni ENG    [facility]

## 2024-02-27 NOTE — TELEPHONE ENCOUNTER
PATIENTS WIFE CALLED STATING PATIENT WAS DX WITH ARTHRITIS, THE PAIN IS FLARING UP, THEY ARE QUESTIONING IF THEY NEED A REFERRAL TO SEE AN ARTHRITIS SPECIALIST OR CAN YOU ALSO TREAT THIS AS WELL?  PLEASE ADVISE Patient will need an updated vasectomy consultation in clinic.     Patient was inquiring about a vasectomy at his last physical with Dr. Juanita May.  Dr. May sent me a staff message.    Hosea Cronin PA-C

## 2024-02-28 LAB
TESTOST FREE SERPL-MCNC: 10.6 NG/DL
TESTOST SERPL-MCNC: 473 NG/DL (ref 240–950)

## 2024-03-26 ENCOUNTER — MYC MEDICAL ADVICE (OUTPATIENT)
Dept: FAMILY MEDICINE | Facility: CLINIC | Age: 44
End: 2024-03-26

## 2024-03-26 ENCOUNTER — ANCILLARY PROCEDURE (OUTPATIENT)
Dept: ULTRASOUND IMAGING | Facility: CLINIC | Age: 44
End: 2024-03-26
Attending: FAMILY MEDICINE
Payer: COMMERCIAL

## 2024-03-26 DIAGNOSIS — R10.13 EPIGASTRIC PAIN: ICD-10-CM

## 2024-03-26 PROCEDURE — 76700 US EXAM ABDOM COMPLETE: CPT | Mod: TC | Performed by: RADIOLOGY

## 2025-01-07 ENCOUNTER — OFFICE VISIT (OUTPATIENT)
Dept: FAMILY MEDICINE | Facility: CLINIC | Age: 45
End: 2025-01-07
Payer: COMMERCIAL

## 2025-01-07 VITALS
TEMPERATURE: 96.5 F | RESPIRATION RATE: 14 BRPM | WEIGHT: 206 LBS | HEIGHT: 71 IN | BODY MASS INDEX: 28.84 KG/M2 | DIASTOLIC BLOOD PRESSURE: 87 MMHG | OXYGEN SATURATION: 99 % | HEART RATE: 60 BPM | SYSTOLIC BLOOD PRESSURE: 139 MMHG

## 2025-01-07 DIAGNOSIS — L98.9 SKIN LESION: ICD-10-CM

## 2025-01-07 DIAGNOSIS — Z30.09 VASECTOMY EVALUATION: Primary | ICD-10-CM

## 2025-01-07 PROCEDURE — 99213 OFFICE O/P EST LOW 20 MIN: CPT | Performed by: PHYSICIAN ASSISTANT

## 2025-01-07 ASSESSMENT — PAIN SCALES - GENERAL: PAINLEVEL_OUTOF10: NO PAIN (0)

## 2025-01-07 NOTE — PROGRESS NOTES
"SUBJECTIVE:  The patient presents for discussion of vasectomy.  The procedure was explained in detail using diagram from the vasectomy pamphlet published by [facility].  The permanency and effactiveness of this operation were explained in detail, including casectomy failure rate, bleeding, infection, scarring, chronic pain and epididymititis.  The patient was told to stay at bedrest for 48-72 hours, no heavy lifting for one week and to refrain from sex for 1 week.  The patient was also told to have a post operative sperm count at 3 months.  He should have another birth control until he has a sample that is infertile.      We next discussed the risks of vasectomy. The risks discussed included:    -Bleeding at the time of the procedure or later including hematoma development.  -Infection  -Postoperative discomfort  -Development of a sperm granuloma which is a lump that can form at the site of the transection of the vas deferens.  -Sperm buildup in the testicles that may cause a sensation of congestion or discomfort. which is usually responsive to a non steroidal anti-inflammatory drug.  -Epididymitis can also occur and can cause scrotal discomfort.  -Reconnection of the vas deferans which can occur in up to 1 in 2000 cases per the literature.  -Development of sperm antibodies which may leave a man infertile despite having a reversal of the vasectomy later.  - Long term testicular discomfort which is very rare.        OBJECTIVE:  On exam, this is a well-developed, well-nourished white male.  Weight is 206 lbs 0 oz.  Height is   Ht Readings from Last 2 Encounters:   01/07/25 1.803 m (5' 11\")   02/26/24 1.803 m (5' 11\")       Exam reveals a normal circumcised penis, no lesions.  Testes are descended bilaterally. Both vas deferens were easily identified by palpation.  No abnormalities were noted. Exam was limited to the genitalia    ASSESSMENT:  Vasectomy evaluation  Skin tag-   PLAN:  He will schedule a " vasectomy at his convenience. He was asked to wear warm layers of clothes on top and to wear warm socks.  He is aware that he will need to take several days off from work and any physical activity and essentially rest for two days with ice packs and ibuprofen for discomfort. He was also informed that he will need to bring a semen sample in 3 months to make sure that he has cleared the urinary tract  of any residual sperm and to make sure that he is sterile. He was informed that he should continue to use contraception until we have proven that he is sterile.  We talked about risks and benefits of removing skin tag he is requesting to have this done at the same time I think that is fine to do well.  Warning signs side effects.  We talked about numbing of the area and doing a shave biopsy.      Hosea Cronin PA-C

## 2025-01-07 NOTE — PATIENT INSTRUCTIONS
Information for your Vasectomy.    Please eat something before your procedure.     Please arrive about 15 minutes before your scheduled time.     It is ok to take some tylenol 1-2 hours prior to your procedure.     Please wear warm clothing.    Procedure takes about 1 hr but plan on being at the clinic about 1.5 hours.    Feel free to wear compression shorts/ or arthletic supporter for comfort.     Plan on very light activity for 1 wk with no lifting more that 20 lbs.     I recommend taking 2 days to recline with Icing 20 mins every couple yours.     Ok to use: non-steroidal anti-inflammatory medication like: Ibuprofen 600-800 mg three times daily or Aleve twice daily and/ or Tylenol 1-2 tabs po q6h as needed.    Abstain  from intercourse for about 1 wk so you can heal.    You can shower normally the next day. Just be gentle around that area.     You are not consider sterile until you leave a semen sample that doesn't contain sperm. (usually done about 3 months after vasectomy)    You will need to ejaculate at least 20 times between your surgery and the first sample.     Your may go home after procedure is complete.  There may be some pain in your groin for 3 to 4 days after the procedure.  You may have some blood or yellow liquid that loses from the incision sites.  He may have some swelling and bruising.  This is all normal.    Sutures will dissolve on their own and that can take a week or 2.    Watch for signs of infection which may include fever, pussy discharge, increased pain, swelling or redness.  Please contact us if this occurs.    Feel free to reach out to me with any questions that you have.     It is important that you rest for the first 48 hours and keep your activities minimal.        Thanks,  Hosea Cronin PA-C

## 2025-01-16 ENCOUNTER — MYC MEDICAL ADVICE (OUTPATIENT)
Dept: FAMILY MEDICINE | Facility: CLINIC | Age: 45
End: 2025-01-16
Payer: COMMERCIAL

## 2025-01-16 DIAGNOSIS — Z30.09 VASECTOMY EVALUATION: Primary | ICD-10-CM

## 2025-01-21 RX ORDER — DIAZEPAM 10 MG/1
TABLET ORAL
Qty: 1 TABLET | Refills: 0 | Status: SHIPPED | OUTPATIENT
Start: 2025-01-21

## 2025-06-11 ENCOUNTER — RESULTS FOLLOW-UP (OUTPATIENT)
Dept: FAMILY MEDICINE | Facility: CLINIC | Age: 45
End: 2025-06-11